# Patient Record
Sex: MALE | Race: WHITE | NOT HISPANIC OR LATINO
[De-identification: names, ages, dates, MRNs, and addresses within clinical notes are randomized per-mention and may not be internally consistent; named-entity substitution may affect disease eponyms.]

---

## 2018-12-17 ENCOUNTER — TRANSCRIBE ORDERS (OUTPATIENT)
Dept: SCHEDULING | Age: 48
End: 2018-12-17

## 2018-12-17 DIAGNOSIS — R07.89 OTHER CHEST PAIN: Primary | ICD-10-CM

## 2018-12-24 ENCOUNTER — HOSPITAL ENCOUNTER (OUTPATIENT)
Dept: RADIOLOGY | Facility: HOSPITAL | Age: 48
Discharge: HOME | End: 2018-12-24
Attending: INTERNAL MEDICINE
Payer: COMMERCIAL

## 2018-12-24 DIAGNOSIS — R07.89 OTHER CHEST PAIN: ICD-10-CM

## 2018-12-24 PROCEDURE — 74220 X-RAY XM ESOPHAGUS 1CNTRST: CPT

## 2019-08-07 ENCOUNTER — APPOINTMENT (OUTPATIENT)
Dept: GENERAL RADIOLOGY | Facility: HOSPITAL | Age: 49
End: 2019-08-07

## 2019-08-07 ENCOUNTER — HOSPITAL ENCOUNTER (EMERGENCY)
Facility: HOSPITAL | Age: 49
Discharge: LEFT BEFORE DISCHARGE | End: 2019-08-07
Admitting: EMERGENCY MEDICINE

## 2019-08-07 VITALS
WEIGHT: 210 LBS | OXYGEN SATURATION: 100 % | BODY MASS INDEX: 31.1 KG/M2 | HEIGHT: 69 IN | RESPIRATION RATE: 18 BRPM | TEMPERATURE: 98.5 F | SYSTOLIC BLOOD PRESSURE: 151 MMHG | HEART RATE: 73 BPM | DIASTOLIC BLOOD PRESSURE: 72 MMHG

## 2019-08-07 DIAGNOSIS — I51.7 MILD CARDIOMEGALY: ICD-10-CM

## 2019-08-07 DIAGNOSIS — Z53.29 LEFT AGAINST MEDICAL ADVICE: ICD-10-CM

## 2019-08-07 DIAGNOSIS — R06.00 DYSPNEA, UNSPECIFIED TYPE: ICD-10-CM

## 2019-08-07 DIAGNOSIS — R07.9 CHEST PAIN, UNSPECIFIED TYPE: Primary | ICD-10-CM

## 2019-08-07 LAB
ALBUMIN SERPL-MCNC: 4.3 G/DL (ref 3.5–4.8)
ALBUMIN/GLOB SERPL: 1.5 G/DL (ref 1–1.7)
ALP SERPL-CCNC: 56 U/L (ref 32–91)
ALT SERPL W P-5'-P-CCNC: 31 U/L (ref 17–63)
ANION GAP SERPL CALCULATED.3IONS-SCNC: 16.4 MMOL/L (ref 5–15)
AST SERPL-CCNC: 25 U/L (ref 15–41)
BASOPHILS # BLD AUTO: 0 10*3/MM3 (ref 0–0.2)
BASOPHILS NFR BLD AUTO: 0.7 % (ref 0–1.5)
BILIRUB SERPL-MCNC: 0.7 MG/DL (ref 0.3–1.2)
BNP SERPL-MCNC: 18 PG/ML
BUN BLD-MCNC: 21 MG/DL (ref 8–20)
BUN/CREAT SERPL: 23.3 (ref 6.2–20.3)
CALCIUM SPEC-SCNC: 9.3 MG/DL (ref 8.9–10.3)
CHLORIDE SERPL-SCNC: 104 MMOL/L (ref 101–111)
CO2 SERPL-SCNC: 21 MMOL/L (ref 22–32)
CREAT BLD-MCNC: 0.9 MG/DL (ref 0.7–1.2)
D DIMER PPP FEU-MCNC: <0.17 MCGFEU/ML (ref 0.17–0.59)
DEPRECATED RDW RBC AUTO: 41.6 FL (ref 37–54)
EOSINOPHIL # BLD AUTO: 0.1 10*3/MM3 (ref 0–0.4)
EOSINOPHIL NFR BLD AUTO: 1.7 % (ref 0.3–6.2)
ERYTHROCYTE [DISTWIDTH] IN BLOOD BY AUTOMATED COUNT: 12.9 % (ref 12.3–15.4)
GFR SERPL CREATININE-BSD FRML MDRD: 89 ML/MIN/1.73
GLOBULIN UR ELPH-MCNC: 2.8 GM/DL (ref 2.5–3.8)
GLUCOSE BLD-MCNC: 91 MG/DL (ref 65–99)
HCT VFR BLD AUTO: 43.5 % (ref 37.5–51)
HGB BLD-MCNC: 14.7 G/DL (ref 13–17.7)
LIPASE SERPL-CCNC: 35 U/L (ref 22–51)
LYMPHOCYTES # BLD AUTO: 1.6 10*3/MM3 (ref 0.7–3.1)
LYMPHOCYTES NFR BLD AUTO: 27 % (ref 19.6–45.3)
MCH RBC QN AUTO: 31.4 PG (ref 26.6–33)
MCHC RBC AUTO-ENTMCNC: 33.8 G/DL (ref 31.5–35.7)
MCV RBC AUTO: 92.9 FL (ref 79–97)
MONOCYTES # BLD AUTO: 0.5 10*3/MM3 (ref 0.1–0.9)
MONOCYTES NFR BLD AUTO: 8.2 % (ref 5–12)
NEUTROPHILS # BLD AUTO: 3.8 10*3/MM3 (ref 1.7–7)
NEUTROPHILS NFR BLD AUTO: 62.4 % (ref 42.7–76)
NRBC BLD AUTO-RTO: 0.1 /100 WBC (ref 0–0.2)
PLATELET # BLD AUTO: 185 10*3/MM3 (ref 140–450)
PMV BLD AUTO: 8.8 FL (ref 6–12)
POTASSIUM BLD-SCNC: 3.4 MMOL/L (ref 3.6–5.1)
PROT SERPL-MCNC: 7.1 G/DL (ref 6.1–7.9)
RBC # BLD AUTO: 4.68 10*6/MM3 (ref 4.14–5.8)
SODIUM BLD-SCNC: 138 MMOL/L (ref 136–144)
TROPONIN I SERPL-MCNC: <0.03 NG/ML (ref 0–0.03)
WBC NRBC COR # BLD: 6.1 10*3/MM3 (ref 3.4–10.8)

## 2019-08-07 PROCEDURE — 93005 ELECTROCARDIOGRAM TRACING: CPT

## 2019-08-07 PROCEDURE — 71045 X-RAY EXAM CHEST 1 VIEW: CPT

## 2019-08-07 PROCEDURE — 83690 ASSAY OF LIPASE: CPT | Performed by: NURSE PRACTITIONER

## 2019-08-07 PROCEDURE — 85025 COMPLETE CBC W/AUTO DIFF WBC: CPT | Performed by: NURSE PRACTITIONER

## 2019-08-07 PROCEDURE — 83880 ASSAY OF NATRIURETIC PEPTIDE: CPT | Performed by: NURSE PRACTITIONER

## 2019-08-07 PROCEDURE — 80053 COMPREHEN METABOLIC PANEL: CPT | Performed by: NURSE PRACTITIONER

## 2019-08-07 PROCEDURE — 99284 EMERGENCY DEPT VISIT MOD MDM: CPT

## 2019-08-07 PROCEDURE — 84484 ASSAY OF TROPONIN QUANT: CPT | Performed by: NURSE PRACTITIONER

## 2019-08-07 PROCEDURE — 85379 FIBRIN DEGRADATION QUANT: CPT | Performed by: NURSE PRACTITIONER

## 2019-08-07 RX ORDER — SODIUM CHLORIDE 0.9 % (FLUSH) 0.9 %
10 SYRINGE (ML) INJECTION AS NEEDED
Status: DISCONTINUED | OUTPATIENT
Start: 2019-08-07 | End: 2019-08-07 | Stop reason: HOSPADM

## 2019-08-07 RX ORDER — RAMIPRIL 10 MG/1
10 CAPSULE ORAL 2 TIMES DAILY
COMMUNITY

## 2019-08-07 RX ORDER — ASPIRIN 81 MG/1
81 TABLET, CHEWABLE ORAL 2 TIMES DAILY
COMMUNITY

## 2019-08-07 RX ORDER — ISOSORBIDE DINITRATE 30 MG/1
30 TABLET ORAL 2 TIMES DAILY
COMMUNITY

## 2019-08-07 RX ORDER — ATORVASTATIN CALCIUM 80 MG/1
80 TABLET, FILM COATED ORAL 2 TIMES DAILY
COMMUNITY

## 2019-08-07 RX ORDER — AMLODIPINE BESYLATE 5 MG/1
5 TABLET ORAL 2 TIMES DAILY
COMMUNITY

## 2019-08-07 RX ORDER — NITROGLYCERIN 0.4 MG/1
0.4 TABLET SUBLINGUAL
Status: DISCONTINUED | OUTPATIENT
Start: 2019-08-07 | End: 2019-08-07 | Stop reason: HOSPADM

## 2019-08-07 RX ORDER — HYDROCHLOROTHIAZIDE 12.5 MG/1
12.5 CAPSULE, GELATIN COATED ORAL 2 TIMES DAILY
COMMUNITY

## 2019-08-07 RX ORDER — POTASSIUM CHLORIDE 20 MEQ/1
20 TABLET, EXTENDED RELEASE ORAL DAILY
Status: DISCONTINUED | OUTPATIENT
Start: 2019-08-07 | End: 2019-08-07 | Stop reason: HOSPADM

## 2019-08-07 RX ADMIN — POTASSIUM CHLORIDE 20 MEQ: 1500 TABLET, EXTENDED RELEASE ORAL at 20:25

## 2019-08-08 NOTE — DISCHARGE INSTRUCTIONS
Please continue your daily medications, today in the ED your serum potassium measured 3.4, increase potassium rich foods in diet, discuss with your cardiologist    Today you chose to leave AGAINST MEDICAL ADVICE, this prevents further evaluation of your cardiac-like symptoms including serial troponins and EKGs, inability to further evaluate this could lead to worsening symptoms including heart attack and/or death.  Please return to the ER if you desire further evaluation and treatment.    Please call your cardiologist upon return home tomorrow for continued evaluation and treatment.  No strenuous activity until follow-up.    10 you your daily medications as directed.

## 2019-08-08 NOTE — ED PROVIDER NOTES
Subjective   Context:48-year-old male patient presents the ER with complaint of anterior chest discomfort; patient reports he walked across the bed for bridge last night, states that he woke up today with some discomfort to the anterior part of his chest.  He reports that he had improvement after taking an extra Imdur as well as sublingual nitroglycerin.  He reports that the pain is occurred off and on all day, he states that it radiates into his jaw as well as to his neck.  He reports that he had a sensation of irregular heartbeat but had no fainting near fainting episodes, no diaphoresis nausea or vomiting.  He denies recent illness, fever or chills.  The patient reports a history of cardiac stents, last AMI in May 2019.  She is currently here out of town on business      Location: Chest  Quality: Dull  Timing: This morning  Duration: Waxes and wanes  Aggravating: Activity  Alleviating: Imdur, sublingual nitro    PCP:  Silver GRESHAM            Review of Systems   Constitutional: Negative for chills, fatigue and fever.   HENT: Negative for congestion, dental problem, ear discharge, ear pain, mouth sores, sore throat, trouble swallowing and voice change.    Eyes: Negative for photophobia, pain, discharge and visual disturbance.   Respiratory: Positive for chest tightness and shortness of breath. Negative for cough.    Cardiovascular: Positive for chest pain and palpitations. Negative for leg swelling.   Gastrointestinal: Negative for abdominal pain, diarrhea, nausea and vomiting.   Genitourinary: Negative for decreased urine volume, difficulty urinating, dysuria, flank pain, hematuria and urgency.   Musculoskeletal: Negative for arthralgias, back pain, myalgias, neck pain and neck stiffness.   Skin: Negative for color change, pallor, rash and wound.   Neurological: Negative for dizziness, weakness, light-headedness, numbness and headaches.   Hematological: Negative for adenopathy. Does not bruise/bleed easily.      Prior to Admission medications    Medication Sig Start Date End Date Taking? Authorizing Provider   amLODIPine (NORVASC) 5 MG tablet Take 5 mg by mouth 2 (Two) Times a Day.   Yes Jennifer Zendejas MD   aspirin 81 MG chewable tablet Chew 81 mg 2 (Two) Times a Day.   Yes Jennifer Zendejas MD   atorvastatin (LIPITOR) 80 MG tablet Take 80 mg by mouth 2 (Two) Times a Day.   Yes Jennifer Zendejas MD   hydrochlorothiazide (MICROZIDE) 12.5 MG capsule Take 12.5 mg by mouth 2 (Two) Times a Day.   Yes Jennifer Zendejas MD   isosorbide dinitrate (ISORDIL) 30 MG tablet Take 30 mg by mouth 2 (Two) Times a Day.   Yes Jennifer Zendejas MD   metoprolol tartrate (LOPRESSOR) 25 MG tablet Take 25 mg by mouth 2 (Two) Times a Day.   Yes Jennifer Zendejas MD   ramipril (ALTACE) 10 MG capsule Take 10 mg by mouth 2 (Two) Times a Day.   Yes Jennifer Zendejas MD   ticagrelor (BRILINTA) 90 MG tablet tablet Take 90 mg by mouth 2 (Two) Times a Day.   Yes Jennifer Zendejas MD         Past Medical History:   Diagnosis Date   • Coronary artery disease    • GERD (gastroesophageal reflux disease)    • Hyperlipidemia    • Hypertension    • Migraine    • Myocardial infarction (CMS/HCC)        No Known Allergies    Past Surgical History:   Procedure Laterality Date   • ABDOMINAL SURGERY     • HERNIA REPAIR         Family History   Problem Relation Age of Onset   • Hodgkin's lymphoma Mother    • Heart attack Father    • Lung cancer Father        Social History     Socioeconomic History   • Marital status:      Spouse name: Not on file   • Number of children: Not on file   • Years of education: Not on file   • Highest education level: Not on file   Tobacco Use   • Smoking status: Former Smoker     Packs/day: 1.00     Years: 15.00     Pack years: 15.00     Last attempt to quit: 2003     Years since quittin.6   Substance and Sexual Activity   • Alcohol use: No     Frequency: Never   • Drug use: No   •  Sexual activity: Defer           Objective   Physical Exam       Vital signs and triage nurse note reviewed.   Constitutional: Awake, alert; well-developed and well-nourished. No acute distress is noted.   HEENT: Normocephalic, atraumatic; pupils are PERRL with intact EOM; oropharynx is pink and moist without exudate or erythema.   Neck: Supple, full range of motion without pain; no cervical lymphadenopathy.   Cardiovascular: Regular rate and rhythm, normal S1-S2.   Pulmonary: Respiratory effort regular nonlabored, breath sounds clear to auscultation all fields.   Abdomen: Soft, nontender nondistended with normoactive bowel sounds; no rebound or guarding.   Musculoskeletal: Independent range of motion of all extremities with no palpable tenderness or edema.   Neuro: Alert oriented x3, speech is clear and appropriate, GCS 15   Skin:  Fleshtone warm, dry, intact; no erythematous or petechial rash or lesion    Procedures       ECG 12 Lead   Preliminary Result   HEART RATE= 74  bpm   RR Interval= 808  ms   KS Interval= 150  ms   P Horizontal Axis= 0  deg   P Front Axis= 55  deg   QRSD Interval= 95  ms   QT Interval= 382  ms   QRS Axis= 23  deg   T Wave Axis= 26  deg   - NORMAL ECG -   Sinus rhythm   Electronically Signed By:    Date and Time of Study: 2019-08-07 18:46:03        Viewed by me, viewed and interpreted by Dr Aguilar: Agreed with above, no prior for comparison, patient lives in [place]    ED Course        Xr Chest 1 View    Result Date: 8/7/2019  Mild cardiomegaly. No evidence of active lung disease.  Electronically Signed By-Anna Bai On:8/7/2019 7:41 PM This report was finalized on [id number] by  Anna Bai, .    Results for orders placed or performed during the hospital encounter of 08/07/19   Comprehensive Metabolic Panel   Result Value Ref Range    Glucose 91 65 - 99 mg/dL    BUN 21 (H) 8 - 20 mg/dL    Creatinine 0.90 0.70 - 1.20 mg/dL    Sodium 138 136 - 144 mmol/L    Potassium 3.4 (L) 3.6 -  5.1 mmol/L    Chloride 104 101 - 111 mmol/L    CO2 21.0 (L) 22.0 - 32.0 mmol/L    Calcium 9.3 8.9 - 10.3 mg/dL    Total Protein 7.1 6.1 - 7.9 g/dL    Albumin 4.30 3.50 - 4.80 g/dL    ALT (SGPT) 31 17 - 63 U/L    AST (SGOT) 25 15 - 41 U/L    Alkaline Phosphatase 56 32 - 91 U/L    Total Bilirubin 0.7 0.3 - 1.2 mg/dL    eGFR Non African Amer 89 >60 mL/min/1.73    Globulin 2.8 2.5 - 3.8 gm/dL    A/G Ratio 1.5 1.0 - 1.7 g/dL    BUN/Creatinine Ratio 23.3 (H) 6.2 - 20.3    Anion Gap 16.4 (H) 5.0 - 15.0 mmol/L   Lipase   Result Value Ref Range    Lipase 35 22 - 51 U/L   BNP   Result Value Ref Range    BNP 18.0 <=100.0 pg/mL   Troponin   Result Value Ref Range    Troponin I <0.030 0.000 - 0.030 ng/mL   CBC Auto Differential   Result Value Ref Range    WBC 6.10 3.40 - 10.80 10*3/mm3    RBC 4.68 4.14 - 5.80 10*6/mm3    Hemoglobin 14.7 13.0 - 17.7 g/dL    Hematocrit 43.5 37.5 - 51.0 %    MCV 92.9 79.0 - 97.0 fL    MCH 31.4 26.6 - 33.0 pg    MCHC 33.8 31.5 - 35.7 g/dL    RDW 12.9 12.3 - 15.4 %    RDW-SD 41.6 37.0 - 54.0 fl    MPV 8.8 6.0 - 12.0 fL    Platelets 185 140 - 450 10*3/mm3    Neutrophil % 62.4 42.7 - 76.0 %    Lymphocyte % 27.0 19.6 - 45.3 %    Monocyte % 8.2 5.0 - 12.0 %    Eosinophil % 1.7 0.3 - 6.2 %    Basophil % 0.7 0.0 - 1.5 %    Neutrophils, Absolute 3.80 1.70 - 7.00 10*3/mm3    Lymphocytes, Absolute 1.60 0.70 - 3.10 10*3/mm3    Monocytes, Absolute 0.50 0.10 - 0.90 10*3/mm3    Eosinophils, Absolute 0.10 0.00 - 0.40 10*3/mm3    Basophils, Absolute 0.00 0.00 - 0.20 10*3/mm3    nRBC 0.1 0.0 - 0.2 /100 WBC   D-dimer, Quantitative   Result Value Ref Range    D-Dimer, Quantitative <0.17 (L) 0.17 - 0.59 MCGFEU/mL     Medications   sodium chloride 0.9 % flush 10 mL (not administered)   potassium chloride (K-DUR,KLOR-CON) CR tablet 20 mEq (20 mEq Oral Given 8/7/19 2025)   nitroglycerin (NITROSTAT) SL tablet 0.4 mg (not administered)     /72 (BP Location: Right arm, Patient Position: Sitting)   Pulse 73   Temp  "98.5 °F (36.9 °C) (Oral)   Resp 18   Ht 175.3 cm (69\")   Wt 95.3 kg (210 lb)   SpO2 100%   BMI 31.01 kg/m²             MDM  Comorbidities: Hypertension, CAD with PCI    Previous records reviewed: None for review    Review and summarization of lab specimens, radiology: Reviewed by me    Consultation:  n/a    Differentials: MI, NSTEMI, ACS, unstable angina, exercise angina, PE, bronchitis, pneumonia, CHF; this list is not all inclusive and does not constitute the entirety of considered causes    On reevaluation the patient reports no pain at this time; K-Dur was given for slight decrease in his potassium; patient is instructed to increase his potassium rich foods in diet to follow-up with his cardiologist.  Recommendation was made for admission the hospital patient declined he states he is traveling home tomorrow and states he will follow-up with his cardiologist upon return.  I reviewed risks and benefits of staying in the hospital as well as leaving AGAINST MEDICAL ADVICE including AMI NSTEMI or death, he voices understanding and agrees with these risk but states he has to leave and refuses to stay in the hospital.    Patient is encouraged to return if desired, vital signs remained stable, he is discharged improved stable condition ambulatory with an upright and steady gait AGAINST MEDICAL ADVICE.        Final diagnoses:   Chest pain, unspecified type   Dyspnea, unspecified type   Mild cardiomegaly   Left against medical advice            Monet Calixto NP  08/07/19 2041    "